# Patient Record
Sex: FEMALE | Race: ASIAN | NOT HISPANIC OR LATINO | Employment: UNEMPLOYED
[De-identification: names, ages, dates, MRNs, and addresses within clinical notes are randomized per-mention and may not be internally consistent; named-entity substitution may affect disease eponyms.]

---
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
## 2021-06-01 ENCOUNTER — RECORDS - HEALTHEAST (OUTPATIENT)
Dept: ADMINISTRATIVE | Facility: CLINIC | Age: 21
End: 2021-06-01

## 2021-06-01 VITALS — WEIGHT: 293 LBS | BODY MASS INDEX: 53.25 KG/M2

## 2021-06-01 VITALS — WEIGHT: 293 LBS | BODY MASS INDEX: 48.82 KG/M2 | HEIGHT: 65 IN

## 2021-06-16 PROBLEM — E66.01 MORBID OBESITY (H): Status: ACTIVE | Noted: 2018-08-15

## 2021-06-16 PROBLEM — E28.8 HYPERANDROGENISM: Status: ACTIVE | Noted: 2018-04-04

## 2021-06-17 NOTE — PROGRESS NOTES
Subjective:       History was provided by the sister.    Evonne Arteaga is a 17 y.o. female who is here for this well-child visit.    Immunization History   Administered Date(s) Administered     DTaP, historic 2000, 2000, 02/18/2001, 12/12/2001, 09/22/2005     HPV Quadrivalent 11/16/2011, 02/14/2012, 01/09/2013     Hep A, historic 07/08/2009     Hep B, historic 2000, 2000, 05/03/2001     Hepatitis A, Ped/Adol 2 Dose IM (18yr & under) 02/14/2012     HiB, historic,unspecified 2000, 2000, 02/18/2001, 08/24/2001     IPV 2000, 2000, 05/03/2001, 09/22/2005     Influenza, inj, historic,unspecified 10/18/2008     Influenza, seasonal,quad inj 6-35 mos 09/27/2010, 09/19/2011, 01/09/2013, 10/16/2013     Influenza,seasonal quad, PF, 36+MOS 12/23/2015     MMR 12/05/2001, 09/22/2005     Meningococcal MCV4P 11/16/2011, 09/02/2016     Tdap 11/16/2011     Varicella Zoster Immune Globulin 08/23/2013     Patient Active Problem List   Diagnosis     Obesity     Delayed Developmental Milestones     Urinary Incontinence     Microscopic Hematuria     Acanthosis Nigricans     Vitamin D deficiency     Pre-diabetes     Hyperlipidemia     Elevated alanine aminotransferase (ALT) level     Hyperandrogenism      The following portions of the patient's history were reviewed and updated as appropriate: allergies, current medications, past family history, past medical history, past social history, past surgical history and problem list.    Current Issues:  Need rx for undergarments due to incontinence.    Currently menstruating? no--reports she has never had a menstrual cycle.  Later relates that previously she had been given oral contraceptive pills.  She did have some vaginal bleeding then.    She did not continue to take those.    Further review of the record indicates normal pelvic ultrasound (structurally normal uterus and ovaries without evidence of excess follicles).    Laboratory evidence of  "hyperandrogenism.  Documentation of normal adrenal glands was incomplete from the renal ultrasound that I have access to.  I am not sure if this was completed.  Sexually active? no     Review of Nutrition:  Current diet: eats well  Balanced diet? unknown    Social Screening:   Family Unit: sister, brother, DAVE    Sister identifies herself as PCA for this patient.  Parental relations: ok    Secondhand smoke exposure? no    School: [employer] , Grade: 11th  School Concerns: Has IEP  Discipline concerns? no  Concerns regarding behavior with peers? no  School performance: doing well; no concerns    Sports/Exercise:  none  Social Activities(recreation, hobbies, TV): none  Peer Group (friends, dating, sexual activity) : family  Work/Future Plans: \"college\"    PHQ-9:  Little interest or pleasure in doing things: Not at all  Feeling down, depressed, or hopeless: Not at all  Trouble falling or staying asleep, or sleeping too much: Not at all  Feeling tired or having little energy: Not at all  Poor appetite or overeating: Not at all  Feeling bad about yourself - or that you are a failure or have let yourself or your family down: Not at all  Trouble concentrating on things, such as reading the newspaper or watching television: Not at all  Moving or speaking so slowly that other people could have noticed. Or the opposite - being so fidgety or restless that you have been moving around a lot more than usual: Not at all  Thoughts that you would be better off dead, or of hurting yourself in some way: Not at all  PHQ-9 Total Score: 0     Dyslipidemia Risk Screening  Have any of the child's parents or grandparents had a stroke or heart attack before age 55?: No  Any parents with high cholesterol or currently taking medications to treat?: No     Objective:   /76 (Patient Site: Right Arm, Patient Position: Sitting, Cuff Size: Adult Regular)  Pulse 80  Temp 99.7  F (37.6  C) (Oral)   Ht 5' 5\" (1.651 m)  Wt (!) 310 " "lb (140.6 kg)  Breastfeeding? No  BMI 51.59 kg/m2     Length: 5' 5\" (1.651 m) (62 %, Z= 0.31, Source: [facility] 2-20 Years)  Weight: 310 lb (140.6 kg) (>99 %, Z= 2.74, Source: [facility] 2-20 Years)  Blood Pressure: 118/76  BMI: Body mass index is 51.59 kg/(m^2).  BSA: Body surface area is 2.54 meters squared.    Growth parameters are noted and are appropriate for age.    Vitals:    04/04/18 1330   BP: 118/76   Patient Site: Right Arm   Patient Position: Sitting   Cuff Size: Adult Regular   Pulse: 80   Temp: 99.7  F (37.6  C)   TempSrc: Oral   Weight: (!) 310 lb (140.6 kg)   Height: 5' 5\" (1.651 m)     Gen:  Alert  Head:  normocephalic  EYES: PERRL/EOMI  ENT: Ears normal. TMs normal.  Normal oral pharynx.  Neck:  Normal, no masses  Resp:  Clear bilaterally  Cv:  Regular without murmur  Abd:  Morbidly obese, soft, no masses or organomegaly noted.  Musculoskeletal:  Normal muscle tone and bulk  Skin:  No rashes.  Warm and dry.  Neurologic:  Reflexes normal. Gross motor is normal.  Endocrine: deep voice, hirsuitism, acanthosis  Genitalia:  Normal female--small breasts, she declined genital and breast exams     Hearing Screening    Method: Audiometry    125Hz 250Hz 500Hz 1000Hz 2000Hz 3000Hz 4000Hz 6000Hz 8000Hz   Right ear:   Pass Pass Pass  Pass Pass    Left ear:   Pass Pass Pass  Pass Pass       Visual Acuity Screening    Right eye Left eye Both eyes   Without correction: 10/12.5 10/12.5    With correction:         Assessment:     Well adolescent          Obesity     Delayed Developmental Milestones     Urinary Incontinence     Microscopic Hematuria     Acanthosis Nigricans     Vitamin D deficiency     Pre-diabetes     Hyperlipidemia     Elevated alanine aminotransferase (ALT) level     Hyperandrogenism         Plan:     1. Anticipatory guidance discussed.  Gave handout on well-child issues at this age.    Social: plan for guardianship  Parenting: Homework  Nutrition: Dieting  Health: Dental Care  Safety: Seat Belts  Sexuality: " no plans, not sexually active    2.  Weight management:  The patient was counseled regarding nutrition and physical activity.    3. Development: appropriate for age    4. Annual dental check up is recommended    5. Immunizations today: None are due    6. Follow-up visit in 1 year for next well child visit, or sooner as needed.     7. Referrals: Needs follow up at Pediatric Endocrinology    1. Acanthosis Nigricans  2. Obesity  - Glycosylated Hemoglobin A1c  - Hepatic Profile  - not fasting so lipids deferred    3. Primary amenorrhea  Barring any change in labs, should start back on OCPs to decrease risk of endometrial hyperplasia.  - Basic Metabolic Panel  - Luteinizing Hormone (LH)  - Follicle Stimulating Hormone (FSH)  - Dehydroepiandrosterone Sulfate, Serum (DHEAS)  - HM2(CBC w/o Differential)  - Prolactin  - Ambulatory referral to Pediatric Endocrinology    4. Vitamin D deficiency  - Vitamin D, Total (25-Hydroxy)    5. Hyperandrogenism

## 2021-06-19 NOTE — PROGRESS NOTES
Subjective:  18 y.o. female with concerns of follow-up on a number of issues.  She has seen her endocrinologist in July.  She reports she is taking 3 medicines currently.  I am unsure what they are.  Most likely to include vitamin D, metformin, and an oral contraceptive pill.  There was a discussion of using 2 different preparations of vitamin D and having 7-10 days of Provera to induce a uterine bleed.  Patient does not report that that has occurred.  She has some irregular spotting at this time.  Unfortunately is difficult to say what medication she is taking at this time so is unknown if this is in relation or an absence to oral contraceptives.  She is here with her mother today.  They request a renewal for diapers.  Patient has turned 18.  She has 1 more year of high school to accomplish.  They needed forms to prove varus sella immunity which were provided for them today.    Patient states she is checking blood sugars occasionally.  The postprandial 1 hour glucoses are usually in the range of 100-130.  Fasting glucoses have been in the 80s.    Mother makes mention of some concern that vitamin D interact with brown rice.  Therefore she has not been offering brown rice but is rather been providing white kana rice.    Outpatient Medications Prior to Visit   Medication Sig Dispense Refill     ergocalciferol (VITAMIN D2) 50,000 unit capsule Take 1 capsule (50,000 Units total) by mouth once a week. 12 capsule 0     norethindrone-ethinyl estradiol (LOESTRIN 1/20, 21,) 1-20 mg-mcg per tablet Take 1 tablet by mouth daily. 84 tablet 3     No facility-administered medications prior to visit.       History   Smoking Status     Never Smoker   Smokeless Tobacco     Never Used      Objective:  /70 (Patient Site: Right Arm, Patient Position: Sitting, Cuff Size: Adult Regular) Comment (Patient Site): forearm  Pulse 80  Wt (!) 320 lb (145.2 kg)  LMP Comment: LMP middle June 2017-it was just spotting after she took the  birth control medication.  Breastfeeding? No  GENERAL: alert, not distressed  CHEST: clear, no rales, rhonchi, or wheezes  CARDIAC: regular without murmur  ABDOMEN: morbidly obese, soft, non tender, non distended, normal bowel sounds    Assessment and Plan:   1. Morbid obesity (H)  Discussed weight loss.  Discussed no known interaction between white rice, brown rice, or vitamin D.  Discussed brown rice recommended in light of insulin resistance.    2. Urinary incontinence, unspecified type  Order for urinary incontinence supplies to be sent to [facility].  - Incontinence supplies    3. Pre-diabetes  See diet counseling above regarding diet.  Daily exercise was recommended.  Recommended that they review home medications and return a call to us to inform us which medications are currently been taken.    We discussed that a follow-up with endocrinology in October has been recommended.    This visit was conducted with the aid of a professional .

## 2021-07-03 NOTE — ADDENDUM NOTE
Addendum Note by James Simental MD at 4/5/2018 10:48 AM     Author: James Simental MD Service: -- Author Type: Physician    Filed: 4/5/2018 10:48 AM Encounter Date: 4/4/2018 Status: Signed    : James Simental MD (Physician)    Addended by: JAMES SIMENTAL on: 4/5/2018 10:48 AM        Modules accepted: Orders

## 2021-07-09 ENCOUNTER — MEDICAL CORRESPONDENCE (OUTPATIENT)
Dept: HEALTH INFORMATION MANAGEMENT | Facility: CLINIC | Age: 21
End: 2021-07-09

## 2022-12-06 ENCOUNTER — OFFICE VISIT (OUTPATIENT)
Dept: FAMILY MEDICINE | Facility: CLINIC | Age: 22
End: 2022-12-06
Payer: COMMERCIAL

## 2022-12-06 VITALS
BODY MASS INDEX: 47.09 KG/M2 | HEART RATE: 111 BPM | WEIGHT: 293 LBS | OXYGEN SATURATION: 97 % | DIASTOLIC BLOOD PRESSURE: 85 MMHG | RESPIRATION RATE: 18 BRPM | HEIGHT: 66 IN | SYSTOLIC BLOOD PRESSURE: 134 MMHG

## 2022-12-06 DIAGNOSIS — L73.2 HIDRADENITIS SUPPURATIVA: Primary | ICD-10-CM

## 2022-12-06 DIAGNOSIS — E28.2 PCOS (POLYCYSTIC OVARIAN SYNDROME): ICD-10-CM

## 2022-12-06 PROCEDURE — 99204 OFFICE O/P NEW MOD 45 MIN: CPT | Mod: GC | Performed by: STUDENT IN AN ORGANIZED HEALTH CARE EDUCATION/TRAINING PROGRAM

## 2022-12-06 RX ORDER — DOXYCYCLINE 100 MG/1
100 CAPSULE ORAL 2 TIMES DAILY
Qty: 60 CAPSULE | Refills: 1 | Status: SHIPPED | OUTPATIENT
Start: 2022-12-06 | End: 2022-12-19

## 2022-12-06 RX ORDER — METFORMIN HCL 500 MG
TABLET, EXTENDED RELEASE 24 HR ORAL
Qty: 53 TABLET | Refills: 0 | Status: SHIPPED | OUTPATIENT
Start: 2022-12-06 | End: 2022-12-19

## 2022-12-06 NOTE — PROGRESS NOTES
"  CHIEF COMPLAINT                                                      Chief Complaint   Patient presents with     Cyst     Open Cyst on butt, muscle sore        ASSESSMENT/PLAN:     (L73.2) Hidradenitis suppurativa  Comment: New diagnosis for her. Has inflammatory nodules, scarring, one active abscess in the right axilla, and possible tracts in the groin. No systemic symptoms of infection, normal vitals. Given widespread nature, she would likely fail topical treatments and thus oral doxy is appropriate.   PCOS likely contributing (see below).  Plan:   -Start doxycycline hyclate (VIBRAMYCIN) 100 MG         Capsule  -Adult Dermatology Referral  -Follow up 3-4 weeks    (E28.2) PCOS (polycystic ovarian syndrome)  Comment: Hasn't had period since middle school, BMI is 56, and has virilism - meets criteria for PCOS diagnosis. Okay with starting metformin today.   Plan:  -Start metFORMIN (GLUCOPHAGE XR) 500 MG 24 hr tablet with taper instructions to increase to 500mg BID after one week  -F/u 3-4 weeks        Options for treatment and follow-up care were reviewed with the patient and/or guardian. Evonne Arteaga and/or guardian engaged in the decision making process and verbalized understanding of the options discussed and agreed with the final plan    Precepted with Dr. Rosenstein.    Petey Dudley MD - PGY3  [facility] Medicine Residency      SUBJECTIVE:                                                    Evonne Arteaga is a 22 year old year old female who presents to clinic today for the following health issues:    \"cyst on butt\"  Noticed it a week ago  Between anus and vagina  Bleeding when wiping  Not draining any pus  No fevers/chills/ns  Has had something like this before, has never been seen for it before  Thinks it's improving but doesn't know how    ----------------------------------------------------------------------------------------------------------------------  Patient Active Problem List   Diagnosis     " Delayed Developmental Milestones     Urinary incontinence     Microscopic Hematuria     Acanthosis Nigricans     Vitamin D deficiency     Pre-diabetes     Hyperlipidemia     Elevated alanine aminotransferase (ALT) level     Hyperandrogenism     Morbid obesity (H)     No past surgical history on file.    Social History     Tobacco Use     Smoking status: Never     Smokeless tobacco: Never   Substance Use Topics     Alcohol use: Not on file     No family history on file.      Problem list and past medical, surgical, social, and family histories reviewed & adjusted, as indicated.    Current Outpatient Medications   Medication Sig Dispense Refill     ACCU-CHEK FASTCLIX LANCET DRUM [ACCU-CHEK FASTCLIX LANCET DRUM] USE IN LANCING DEVICE TO CHECK BLOOD SUGAR 2 TIMES PER DAY.  3     ACCU-CHEK GUIDE strips [ACCU-CHEK GUIDE STRIPS] USE TO TEST BLOOD SUGAR 2 TIMES PER DAY.  3     ergocalciferol (VITAMIN D2) 50,000 unit capsule [ERGOCALCIFEROL (VITAMIN D2) 50,000 UNIT CAPSULE] Take 1 capsule (50,000 Units total) by mouth once a week. 12 capsule 0     metFORMIN (GLUCOPHAGE) 500 MG tablet [METFORMIN (GLUCOPHAGE) 500 MG TABLET] TAKE 1 TABLET DAILY FOR 1-2 WEEKS THEN 1 TAB TWICE DAILY X 1 WK, 1 + 2 TAB X 1WK, 2 TABS TWICE DAILY  3     norethindrone-ethinyl estradiol (LOESTRIN 1/20, 21,) 1-20 mg-mcg per tablet [NORETHINDRONE-ETHINYL ESTRADIOL (LOESTRIN 1/20, 21,) 1-20 MG-MCG PER TABLET] Take 1 tablet by mouth daily. 84 tablet 3     VITAMIN D3 2,000 unit capsule [VITAMIN D3 2,000 UNIT CAPSULE] Take 2,000 Units by mouth daily. Take 2,000 Units by mouth daily.  3     Medication list reviewed and updated as indicated.    No Known Allergies  Allergies reviewed and updated as indicated.  ----------------------------------------------------------------------------------------------------------------------  ROS:  Constitutional, HEENT, cardiovascular, pulmonary, GI, musculoskeletal, neuro, skin, and psych systems are negative, except as  "otherwise noted.    OBJECTIVE:     BP (!) 142/90   Pulse 111   Resp 18   Ht 1.676 m (5' 6\")   Wt (!) 159.2 kg (351 lb)   SpO2 97%   BMI 56.65 kg/m    Body mass index is 56.65 kg/m .  Exam:  Constitutional: healthy, alert and no distress  Head: Normocephalic. Atraumatic. Facial hair present on cheeks and upper lip, chin  Neck: Neck supple. FROM  Cardiovascular: Acyanotic  Respiratory: Normal chest rise. Non-labored breathing.  Musculoskeletal: extremities normal- no gross deformities noted, gait normal and normal muscle tone  Skin: Scattered discrete inflammatory nodules in b/l groin and axillae, with surrounding scarring and a couple tracts in groin. One nodule in R axilla draining pus. Minimal surrounding erythema, no warmth  Neurologic: Gait normal. CN II-XII grossly intact  Psychiatric: mentation appears normal and affect normal/bright      "

## 2022-12-09 NOTE — PROGRESS NOTES
Preceptor Attestation:   Patient seen, evaluated and discussed with the resident Dr. Dudley. I have verified the content of the note, which accurately reflects my assessment of the patient and the plan of care.    Supervising Physician:Benjamin Rosenstein, MD, MA  [facility]

## 2022-12-19 ENCOUNTER — OFFICE VISIT (OUTPATIENT)
Dept: FAMILY MEDICINE | Facility: CLINIC | Age: 22
End: 2022-12-19
Payer: COMMERCIAL

## 2022-12-19 VITALS
WEIGHT: 293 LBS | RESPIRATION RATE: 18 BRPM | DIASTOLIC BLOOD PRESSURE: 87 MMHG | BODY MASS INDEX: 47.09 KG/M2 | SYSTOLIC BLOOD PRESSURE: 134 MMHG | OXYGEN SATURATION: 96 % | HEIGHT: 66 IN | HEART RATE: 118 BPM

## 2022-12-19 DIAGNOSIS — E28.2 PCOS (POLYCYSTIC OVARIAN SYNDROME): ICD-10-CM

## 2022-12-19 DIAGNOSIS — L73.2 HIDRADENITIS SUPPURATIVA: ICD-10-CM

## 2022-12-19 PROCEDURE — 99213 OFFICE O/P EST LOW 20 MIN: CPT | Mod: GC | Performed by: STUDENT IN AN ORGANIZED HEALTH CARE EDUCATION/TRAINING PROGRAM

## 2022-12-19 RX ORDER — METFORMIN HCL 500 MG
TABLET, EXTENDED RELEASE 24 HR ORAL
Qty: 53 TABLET | Refills: 0 | Status: SHIPPED | OUTPATIENT
Start: 2022-12-19 | End: 2023-01-25

## 2022-12-19 RX ORDER — DOXYCYCLINE 100 MG/1
100 CAPSULE ORAL 2 TIMES DAILY
Qty: 60 CAPSULE | Refills: 1 | Status: SHIPPED | OUTPATIENT
Start: 2022-12-19

## 2022-12-19 NOTE — PATIENT INSTRUCTIONS
The Healthy [facility] Plate:    - Eat smaller portions- Caiv qhov ncauj los txo bhavik no kom tsawg*/txu bhavik no anastasiaam tsawg**.   - Eat only 1/4 plate of rice. Even better is no rice at all.  - If eating rice, make it brown rice and long-grain rice, not white rice/short-grain/sticky rice.   - Eat 1/2 plate of vegetables, more than rice, noodles, or meat.  - Eat 2 fruits a day.  - Avoid fatty meats and remove fat from meats before cooking.  - Drink water--not pop, fruit juice or alcohol.  - Use less salt (fish sauce, or soy sauce and pickled vegetables) and avoid mono-sodium glutamate (MSG)  - Boil, grill, or roast more than .  with small amount of vegetable oil. Stop using lard or pork fat.

## 2023-01-04 ENCOUNTER — OFFICE VISIT (OUTPATIENT)
Dept: FAMILY MEDICINE | Facility: CLINIC | Age: 23
End: 2023-01-04
Payer: COMMERCIAL

## 2023-01-04 VITALS
WEIGHT: 293 LBS | BODY MASS INDEX: 47.09 KG/M2 | DIASTOLIC BLOOD PRESSURE: 86 MMHG | SYSTOLIC BLOOD PRESSURE: 121 MMHG | HEART RATE: 119 BPM | HEIGHT: 66 IN | RESPIRATION RATE: 18 BRPM | OXYGEN SATURATION: 97 %

## 2023-01-04 DIAGNOSIS — Z02.89 ENCOUNTER FOR COMPLETION OF FORM WITH PATIENT: Primary | ICD-10-CM

## 2023-01-04 DIAGNOSIS — R62.0 DELAYED MILESTONES: ICD-10-CM

## 2023-01-04 DIAGNOSIS — L73.2 HIDRADENITIS SUPPURATIVA: ICD-10-CM

## 2023-01-04 DIAGNOSIS — F71 MODERATE INTELLECTUAL DISABILITIES: ICD-10-CM

## 2023-01-04 DIAGNOSIS — E28.2 PCOS (POLYCYSTIC OVARIAN SYNDROME): ICD-10-CM

## 2023-01-04 DIAGNOSIS — E66.01 MORBID OBESITY (H): ICD-10-CM

## 2023-01-04 PROCEDURE — 99214 OFFICE O/P EST MOD 30 MIN: CPT | Mod: GC | Performed by: STUDENT IN AN ORGANIZED HEALTH CARE EDUCATION/TRAINING PROGRAM

## 2023-01-04 RX ORDER — ESCITALOPRAM OXALATE 10 MG/1
TABLET ORAL
COMMUNITY
Start: 2023-01-03

## 2023-01-04 NOTE — PROGRESS NOTES
"  CHIEF COMPLAINT                                                      Chief Complaint   Patient presents with     Forms     Fill Medical Treatment form       ASSESSMENT/PLAN:     (Z02.89) Encounter for completion of form with patient  (primary encounter diagnosis)  Comment: Completed disability paperwork for the below disabilities that preclude her from working. Has PCA at home and  at [facility], see HPI for details.    (R62.0) Delayed Developmental Milestones    (F71) Moderate intellectual disability    (E66.01) Morbid obesity (H)  (L73.2) Hidradenitis suppurativa  (E28.2) PCOS (polycystic ovarian syndrome)  Comment: Seeing nutritionist 1/19 for deep dive on diet at home for elevated BMI contributing to PCOS. Hasn't gotten into derm yet, not sure the barrier. Suspect patient's family didn't know to call.  Plan:   -I called patient's PCA, Milagros, to help Evonne schedule with derm  -continue doxycycline in the meantime  -Sent derm referral again        Options for treatment and follow-up care were reviewed with the patient and/or guardian. Evonne Arteaga and/or guardian engaged in the decision making process and verbalized understanding of the options discussed and agreed with the final plan    Precepted with Alejandro Garcia MD.    Petey Dudley MD - PGY3  [facility] Medicine Residency      SUBJECTIVE:                                                    Evonne Arteaga is a 22 year old year old female who presents to clinic today for the following health issues:    Needs form filled out for disability  Mom says they don't know her disability  Has never had formal testing that mom knows of  Doesn't do well at school  Can't drive  Can't do chores around the house  Thus can't work at a job  They have a PCA named Milagros \"that comes to visit\" that I can call - [phone]  Past speech eval from 2015 - receptive and expressive speech dysfunction  [facility] (spoke to Jazmyn) has used \"morbid obesity, moderate " "intellectual disability, hyperandrogenism, vitamin D def, pre-diabetes, hyperlipidemia, elevated alanine aminotransferase, acanthosis nigricans, delayed developmental milestones, urinary incontinence\"  Current  at [facility] is Laura Jin - phone number [phone].    ----------------------------------------------------------------------------------------------------------------------  Patient Active Problem List   Diagnosis     Delayed Developmental Milestones     Urinary incontinence     Microscopic Hematuria     Acanthosis Nigricans     Vitamin D deficiency     Pre-diabetes     Hyperlipidemia     Elevated alanine aminotransferase (ALT) level     Hyperandrogenism     Morbid obesity (H)     No past surgical history on file.    Social History     Tobacco Use     Smoking status: Never     Smokeless tobacco: Never   Substance Use Topics     Alcohol use: Not on file     No family history on file.      Problem list and past medical, surgical, social, and family histories reviewed & adjusted, as indicated.    Current Outpatient Medications   Medication Sig Dispense Refill     ACCU-CHEK FASTCLIX LANCET DRUM [ACCU-CHEK FASTCLIX LANCET DRUM] USE IN LANCING DEVICE TO CHECK BLOOD SUGAR 2 TIMES PER DAY.  3     ACCU-CHEK GUIDE strips [ACCU-CHEK GUIDE STRIPS] USE TO TEST BLOOD SUGAR 2 TIMES PER DAY.  3     doxycycline hyclate (VIBRAMYCIN) 100 MG capsule Take 1 capsule (100 mg) by mouth 2 times daily 60 capsule 1     metFORMIN (GLUCOPHAGE XR) 500 MG 24 hr tablet Take 1 tablet (500 mg) by mouth daily (with dinner) for 7 days, THEN 1 tablet (500 mg) 2 times daily (with meals) for 23 days. 53 tablet 0     metFORMIN (GLUCOPHAGE) 500 MG tablet [METFORMIN (GLUCOPHAGE) 500 MG TABLET] TAKE 1 TABLET DAILY FOR 1-2 WEEKS THEN 1 TAB TWICE DAILY X 1 WK, 1 + 2 TAB X 1WK, 2 TABS TWICE DAILY  3     norethindrone-ethinyl estradiol (LOESTRIN 1/20, 21,) 1-20 mg-mcg per tablet [NORETHINDRONE-ETHINYL ESTRADIOL (LOESTRIN 1/20, 21,) " "1-20 MG-MCG PER TABLET] Take 1 tablet by mouth daily. 84 tablet 3     Medication list reviewed and updated as indicated.    No Known Allergies  Allergies reviewed and updated as indicated.  ----------------------------------------------------------------------------------------------------------------------  ROS:  Constitutional, HEENT, cardiovascular, pulmonary, GI, musculoskeletal, neuro, skin, and psych systems are negative, except as otherwise noted.    OBJECTIVE:     /86   Pulse 119   Resp 18   Ht 1.676 m (5' 6\")   Wt (!) 156 kg (344 lb)   SpO2 97%   BMI 55.52 kg/m    Body mass index is 55.52 kg/m .  Exam:  Constitutional: healthy, alert and no distress  Head: Normocephalic. Atraumatic  Neck: Neck supple. FROM  Cardiovascular: Acyanotic  Respiratory: Normal chest rise. Non-labored breathing.  Musculoskeletal: extremities normal- no gross deformities noted, gait normal and normal muscle tone  Skin: no suspicious lesions or rashes  Neurologic: Gait normal. CN II-XII grossly intact  Psychiatric: mentation appears normal and affect normal/bright      "

## 2023-01-04 NOTE — PROGRESS NOTES
Preceptor Attestation:   Patient seen, evaluated and discussed with the resident. I have verified the content of the note, which accurately reflects my assessment of the patient and the plan of care.  Supervising Physician:Alejandro Garcia MD  [facility]

## 2023-01-06 ENCOUNTER — TELEPHONE (OUTPATIENT)
Dept: DERMATOLOGY | Facility: CLINIC | Age: 23
End: 2023-01-06

## 2023-01-06 NOTE — TELEPHONE ENCOUNTER
This encounter is being sent to inform the clinic that this patient has a referral from Petey Dudley MD in [facility] for the diagnoses of New Hidradenitis suppurativa and has requested that this patient be seen within 1 and/or with 2 weeks.  Based on the availability of our provider(s), we are unable to accommodate this request.      Were all sites offered this patient?  Yes      Does scheduling algorithm request to schedule next available?  Patient has been scheduled for the first available opening with Dr. Barone on 06/22/23.  We have informed the patient that the clinic will review their referral and reach out if a sooner appointment is medically necessary.

## 2023-01-18 ENCOUNTER — OFFICE VISIT (OUTPATIENT)
Dept: FAMILY MEDICINE | Facility: CLINIC | Age: 23
End: 2023-01-18
Payer: COMMERCIAL

## 2023-01-18 VITALS
RESPIRATION RATE: 20 BRPM | OXYGEN SATURATION: 96 % | SYSTOLIC BLOOD PRESSURE: 121 MMHG | HEART RATE: 116 BPM | BODY MASS INDEX: 47.09 KG/M2 | DIASTOLIC BLOOD PRESSURE: 87 MMHG | WEIGHT: 293 LBS | HEIGHT: 66 IN

## 2023-01-18 DIAGNOSIS — Z02.89 ENCOUNTER FOR COMPLETION OF FORM WITH PATIENT: Primary | ICD-10-CM

## 2023-01-18 DIAGNOSIS — N91.2 AMENORRHEA: ICD-10-CM

## 2023-01-18 PROCEDURE — 99213 OFFICE O/P EST LOW 20 MIN: CPT | Mod: GC | Performed by: STUDENT IN AN ORGANIZED HEALTH CARE EDUCATION/TRAINING PROGRAM

## 2023-01-18 RX ORDER — NORETHINDRONE ACETATE AND ETHINYL ESTRADIOL .02; 1 MG/1; MG/1
1 TABLET ORAL DAILY
Qty: 90 TABLET | Refills: 1 | Status: SHIPPED | OUTPATIENT
Start: 2023-01-18

## 2023-01-18 NOTE — PROGRESS NOTES
"OUTPATIENT CLINICAL NUTRITION SERVICES ASSESSMENT    Video-Visit Details    Type of service: Video Visit    Video Start Time (time video started): 10:59 am    Video End Time (time video stopped): 11:22 am    Originating Location (pt. Location): Home    Distant Location (provider location): On-site    Mode of Communication: Video Conference via Marian Busch RD    REASON FOR ASSESSMENT  Evonne Arteaga referred by aFm Norris MD for MNT related to  E28.2 (ICD-10-CM) - PCOS (polycystic ovarian syndrome)    Weight Loss - Overweight/Obesity, PCOS    Per referral encounter note:  \"The Healthy Hmong Plate:     - Eat smaller portions- Caiv qhov ncauj los txo bhavik noj kom tsawg*/txu bhavik noj kuam tsawg**.   - Eat only 1/4 plate of rice. Even better is no rice at all.  - If eating rice, make it brown rice and long-grain rice, not white rice/short-grain/sticky rice.   - Eat 1/2 plate of vegetables, more than rice, noodles, or meat.  - Eat 2 fruits a day.  - Avoid fatty meats and remove fat from meats before cooking.  - Drink water--not pop, fruit juice or alcohol.  - Use less salt (fish sauce, or soy sauce and pickled vegetables) and avoid mono-sodium glutamate (MSG)  - Boil, grill, or roast more than .  with small amount of vegetable oil. Stop using lard or pork fat.\"    Patient accompanied by: N/A    ASSESSMENT   -PMH: acanthosis nigricans, vitamin D def, hyperlipidemia, hyperandrogenism, morbid obesity, moderate intellectual disabilities, urinary incontinence    Would like help losing weight, denies any other needs or concerns. Does report that she is pretty sure she has seen a RD before but is not sure, did not specify what for.     Nutritional Details:   -Food allergies: none  -Food sensitivities: none  -GI concerns: none  -Appetite: good  -Pace of eating: in the middle   -Role of cooking: help with that  -Role of food shopping: help with that      Fruits and vegetables: doesn't recall any fruits " "or vegetables that she likes, eats them throughout the day  Does eat white rice, has tried brown rice but is not sure if she really likes it because it tastes \"different\" than what she is used to    Proteins: depends on what her mom is cooking, she does like chicken but she does not really prefer any other protein foods    Fat: unsure if her mom adds butter or oil to food, believe pt denied but difficult to discern    Diet Recall:  Does not have morning meal because she usually wakes up around 11 or 12 am  Lunch: depends 11 am or 12 pm, is not sure  Dinner: depends 4 pm or 5 pm, is not sure  Snack: throughout the day, chips - grazing   Beverages: water, does not drink any other beverages per report  Right before bed: eating snacks   Dining out: once in a while, depends on what she's feeling     Physical Activity: none, daily living   Days per week: N/A  Duration: N/A  Activity type: N/A  Limitations: none    NUTRITION FOCUSED PHYSICAL ASSESSMENT (NFPA) FOR DIAGNOSING MALNUTRITION  No: unable to adequately assess due to video visit          Observed: unable to adequately assess due to video visit     Obtained from Chart/Interdisciplinary Team: none noted    LABS  Labs reviewed   Latest Reference Range & Units Most Recent   Vitamin D, Total (25-Hydroxy) 30.0 - 80.0 ng/mL 7.5 (L)  4/4/18 14:12   (L): Data is abnormally low     Latest Reference Range & Units Most Recent   LDL Cholesterol Direct <130 mg/dL 204 (H)  8/23/13 11:31   (H): Data is abnormally high    MEDICATIONS  Medications reviewed - metformin, doxycycline hyclate, loestrin for amenorrhea    ANTHROPOMETRICS   Height: 1.676 m (5' 6\")  Weight: 156 kg (344 lbs)  BMI (kg/m2): 56.17  Weight Status: Obesity Grade III BMI >40  IBW: 130 lbs  ADJ BW: 184 lbs  %IBW: 265  Weight History:   Wt Readings from Last 15 Encounters:   01/04/23 (!) 156 kg (344 lb)   12/19/22 (!) 157.9 kg (348 lb)   12/06/22 (!) 159.2 kg (351 lb)   08/15/18 145.2 kg (320 lb) (>99 %, Z= 2.79)* "   04/04/18 140.6 kg (310 lb) (>99 %, Z= 2.74)*   02/11/16 135.2 kg (298 lb) (>99 %, Z= 2.89)*   08/18/14 121.1 kg (267 lb) (>99 %, Z= 3.01)*   10/30/13 (!) 115.2 kg (254 lb) (>99 %, Z= 3.09)*   10/16/13 (!) 112.9 kg (249 lb) (>99 %, Z= 3.06)*   08/23/13 (!) 112.5 kg (248 lb) (>99 %, Z= 3.09)*   01/09/13 (!) 103 kg (227 lb) (>99 %, Z= 3.05)*   02/14/12 92.1 kg (203 lb) (>99 %, Z= 3.04)*   11/16/11 92.5 kg (204 lb) (>99 %, Z= 3.13)*   01/26/11 79.8 kg (176 lb) (>99 %, Z= 3.02)*   02/01/10 70.3 kg (155 lb) (>99 %, Z= 3.04)*     * Growth percentiles are based on CDC (Girls, 2-20 Years) data.   Steady weight gain overall    What is your UBW? Any changes to your weight recently?  343 lbs currently from the last weight she remembers, slowly going down but doesn't remember the highest weight it was. Does not weigh herself at home, gets weighed at the Dr.'s office. No particular goal offered by pt, would like general weight loss per overall discussion.    Dosing weight: 83.6 kg using ADJ BW    ASSESSED NUTRITION NEEDS  Estimated Energy Needs: 1,672-2,090 kcals/day (20-25 Kcal/Kg)  Justification: (obese)  Estimated Protein Needs: 67-84 grams protein/day (0.8-1 g pro/Kg)  Justification: (preservation of lean body mass)  Estimated Fluid Needs: 2,508 mL/day (30 mL/kg)    ASSESSED MALNUTRITION STATUS  % Weight Loss: Weight loss does not meet criteria for malnutrition - presumed intentional if present due to desire for weight management   % Intake: Decreased intake does not meet criteria for malnutrition - presumed intentional if present due to desire for weight management   Subcutaneous Fat Loss: Unable to adequately assess  Loss of Muscle Mass: Unable to adequately assess  Fluid Retention: None noted    Malnutrition Diagnosis: Unable to determine due to lack of adequate NFPE related to video visit     DIAGNOSIS   Nutrition Diagnosis: Excessive energy intake related to frequent consumption of high-calorie, high-fat foods as  "evidenced by BMI of 56.17 and dietary recall of snacking    INTERVENTIONS   Nutrition Prescription: general, healthy nutrition recommendations following a whole foods approach, focusing on MyPlate method for guidance. Ensuring intake of plenty of fruits and vegetables, of a variety of colors and types to promote antioxidant, vitamin/mineral intake. Focusing on lean proteins and avoiding foods that are high in saturated, trans fat. Implementing plenty of plant-based proteins such as nuts, seeds, legumes, and beans. Importance of WGs to promote bowel regularity via insoluble fiber intake. Soluble fiber regularly to help lower cholesterol levels. Focusing on reducing overall refined sugar, high calorie foods. Mindfulness with eating to determine when eating, why, how much.    IMPLEMENTATION   Assessed learning needs and learning preference: N/A  Teaching Method(s) used: Booklet / Handout  Explanation    Nutrition Education (Content):              a)  Discussed: went over pt's current intake, foods that she usually consumes, MyPlate method and explained the importance of satisfaction, fullness with meals, listening to hunger/fullness cues, becoming aware of where you are on the scale when eating, turning snacks into a 1 mini meal (since pt does not eat breakfast) to prevent grazing and \"overconsumption,\" limiting CHO to 1/4 of the plate, focusing on increasing fruits and vegetables, foods rich in fiber to reduce overall calories and increase fullness. Difference between protein, fat, and CHO. Etc.               b)  Provided the following handouts: Eat Right with MyPlate, General, Healthful Nutrition Therapy (2022), Tips for Adding Protein (2018), Healthy Snack List, Hunger-Fullness Scale, Heart-Healthy Fiber Tips (2017)  *pt requesting handouts be texted to her mobile phone number. If unable to do this, RD will contact pt via phone call (pt does not have a MyChart) to see if pt is okay with alternative method of sending " handouts ex. email, home address, etc.    Nutrition Education (Application):              a)  Discussed current eating plans and/or recommended alternative food choices              b)  Patient verbalizes understanding of diet by creating goals that reflect diet recommendations and offering no additional questions or concerns at the end of the meeting.    Anticipate fair compliance   Stage of Change: contemplation  Additional: pt does have a desire for weight loss but provided very little details regarding usual intake - RD provided general recommendations, specific to her as much as able. Is not quite in preparation stage as is not wanting and/or not sure of making a goal, possible area to work on with the information provided.     GOALS  N/A     FOLLOW UP/MONITORING   Progress towards goals will be monitored and evaluated per protocol and Practice Guidelines    Would like to wait and see how it goes before scheduling a Follow-up appointment     Time Spent with Patient  Approx. 23 minutes    Rita Busch RD, LD  Clinical Dietitian  Office: [phone]  Weekend pager: [phone]

## 2023-01-18 NOTE — PROGRESS NOTES
CHIEF COMPLAINT                                                      Chief Complaint   Patient presents with     Forms     DISABILITY FORM       ASSESSMENT/PLAN:     (Z02.89) Encounter for completion of form with patient  (primary encounter diagnosis)  Comment: Medical opinion form filled out for sliding scale insulin with patient today; qualifying diagnoses of moderate intellectual disability, delayed developmental milestones, and morbid obesity.    (N91.2) Amenorrhea  Comment: taking metformin 500mg BID for PCOS. Wants to get back on her birth control as well. Declines a dose increase on metformin at this time.  Plan:   -Restart norethindrone-ethinyl estradiol (MICROGESTIN         1/20) 1-20 MG-MCG tablet        Options for treatment and follow-up care were reviewed with the patient and/or guardian. Evonne Arteaga and/or guardian engaged in the decision making process and verbalized understanding of the options discussed and agreed with the final plan    Precepted with Dr. Rosenstein.    Petey Dudley MD - PGY3  [facility] [place] [facility]      SUBJECTIVE:                                                    Evonne Arteaga is a 22 year old year old female who presents to clinic today for the following health issues:    Disability form  Mental/developmental disability keeping her from working  Applying for sliding scale insulin    HS  Seeing derm next week  Wants to stick with 500mg BID metformin dose  Wants to go back on birth control pills  Not sexually active    ----------------------------------------------------------------------------------------------------------------------  Patient Active Problem List   Diagnosis     Delayed Developmental Milestones     Urinary incontinence     Acanthosis Nigricans     Vitamin D deficiency     Hyperlipidemia     Hyperandrogenism     Morbid obesity (H)     Moderate intellectual disabilities     No past surgical history on file.    Social History     Tobacco Use      "Smoking status: Never     Smokeless tobacco: Never   Substance Use Topics     Alcohol use: Not on file     No family history on file.      Problem list and past medical, surgical, social, and family histories reviewed & adjusted, as indicated.    Current Outpatient Medications   Medication Sig Dispense Refill     ACCU-CHEK FASTCLIX LANCET DRUM [ACCU-CHEK FASTCLIX LANCET DRUM] USE IN LANCING DEVICE TO CHECK BLOOD SUGAR 2 TIMES PER DAY.  3     ACCU-CHEK GUIDE strips [ACCU-CHEK GUIDE STRIPS] USE TO TEST BLOOD SUGAR 2 TIMES PER DAY.  3     doxycycline hyclate (VIBRAMYCIN) 100 MG capsule Take 1 capsule (100 mg) by mouth 2 times daily 60 capsule 1     escitalopram (LEXAPRO) 10 MG tablet        metFORMIN (GLUCOPHAGE XR) 500 MG 24 hr tablet Take 1 tablet (500 mg) by mouth daily (with dinner) for 7 days, THEN 1 tablet (500 mg) 2 times daily (with meals) for 23 days. 53 tablet 0     metFORMIN (GLUCOPHAGE) 500 MG tablet [METFORMIN (GLUCOPHAGE) 500 MG TABLET] TAKE 1 TABLET DAILY FOR 1-2 WEEKS THEN 1 TAB TWICE DAILY X 1 WK, 1 + 2 TAB X 1WK, 2 TABS TWICE DAILY  3     norethindrone-ethinyl estradiol (LOESTRIN 1/20, 21,) 1-20 mg-mcg per tablet [NORETHINDRONE-ETHINYL ESTRADIOL (LOESTRIN 1/20, 21,) 1-20 MG-MCG PER TABLET] Take 1 tablet by mouth daily. 84 tablet 3     Medication list reviewed and updated as indicated.    No Known Allergies  Allergies reviewed and updated as indicated.  ----------------------------------------------------------------------------------------------------------------------  ROS:  Constitutional, HEENT, cardiovascular, pulmonary, GI, musculoskeletal, neuro, skin, and psych systems are negative, except as otherwise noted.    OBJECTIVE:     /87   Pulse 116   Resp 20   Ht 1.676 m (5' 6\")   Wt (!) 155.1 kg (342 lb)   SpO2 96%   BMI 55.20 kg/m    Body mass index is 55.2 kg/m .  Exam:  Constitutional: healthy, alert and no distress  Head: Normocephalic. Atraumatic  Neck: Neck supple. " FROM  Cardiovascular: Acyanotic  Respiratory: Normal chest rise. Non-labored breathing.  Musculoskeletal: extremities normal- no gross deformities noted, gait normal and normal muscle tone  Skin: no suspicious lesions or rashes  Neurologic: Gait normal. CN II-XII grossly intact  Psychiatric: mentation appears normal and affect normal/bright

## 2023-01-19 ENCOUNTER — HOSPITAL ENCOUNTER (OUTPATIENT)
Dept: NUTRITION | Facility: CLINIC | Age: 23
Discharge: HOME | End: 2023-01-19
Admitting: FAMILY MEDICINE
Payer: COMMERCIAL

## 2023-01-19 DIAGNOSIS — E28.2 PCOS (POLYCYSTIC OVARIAN SYNDROME): ICD-10-CM

## 2023-01-19 PROCEDURE — 97802 MEDICAL NUTRITION INDIV IN: CPT | Mod: GT,95

## 2023-01-25 DIAGNOSIS — E28.2 PCOS (POLYCYSTIC OVARIAN SYNDROME): ICD-10-CM

## 2023-01-25 RX ORDER — METFORMIN HCL 500 MG
500 TABLET, EXTENDED RELEASE 24 HR ORAL 2 TIMES DAILY WITH MEALS
Qty: 180 TABLET | Refills: 1 | Status: SHIPPED | OUTPATIENT
Start: 2023-01-25 | End: 2023-07-24

## 2023-01-26 ENCOUNTER — OFFICE VISIT (OUTPATIENT)
Dept: DERMATOLOGY | Facility: CLINIC | Age: 23
End: 2023-01-26
Payer: COMMERCIAL

## 2023-01-26 DIAGNOSIS — L73.2 HIDRADENITIS SUPPURATIVA: Primary | ICD-10-CM

## 2023-01-26 PROCEDURE — 99243 OFF/OP CNSLTJ NEW/EST LOW 30: CPT | Performed by: DERMATOLOGY

## 2023-01-26 RX ORDER — CLINDAMYCIN AND BENZOYL PEROXIDE 10; 50 MG/G; MG/G
GEL TOPICAL 2 TIMES DAILY
Qty: 50 G | Refills: 6 | Status: SHIPPED | OUTPATIENT
Start: 2023-01-26

## 2023-01-26 RX ORDER — DOXYCYCLINE 100 MG/1
100 CAPSULE ORAL 2 TIMES DAILY
Qty: 60 CAPSULE | Refills: 6 | Status: SHIPPED | OUTPATIENT
Start: 2023-01-26

## 2023-01-26 ASSESSMENT — PAIN SCALES - GENERAL: PAINLEVEL: NO PAIN (0)

## 2023-01-26 NOTE — PROGRESS NOTES
Evonne Arteaga , a 22 year old year old female patient, I was asked to see by Dr. Dudley for Hidradenitis.  Patient states this has been present for years.  Patient reports the following symptoms:  Pimples noah xilla and groin.   .  Patient reports the following previous treatments doxy.  Patient reports the following modifying factors none.  Associated symptoms: none.  Patient has no other skin complaints today.  Remainder of the HPI, Meds, PMH, Allergies, FH, and SH was reviewed in chart.      Past Medical History:   Diagnosis Date     Elevated alanine aminotransferase (ALT) level 4/26/2016     Microscopic hematuria     Created by [facility] Annotation: Nov 16 2011  5:17PM - Freddie, Timmy: normal bilateral  renal U/S in 2/2011.        No past surgical history on file.     No family history on file.    Social History     Socioeconomic History     Marital status: Single     Spouse name: Not on file     Number of children: Not on file     Years of education: Not on file     Highest education level: Not on file   Occupational History     Not on file   Tobacco Use     Smoking status: Never     Smokeless tobacco: Never   Substance and Sexual Activity     Alcohol use: Not on file     Drug use: Not on file     Sexual activity: Not on file   Other Topics Concern     Not on file   Social History Narrative     Not on file     Social Determinants of Health     Financial Resource Strain: Not on file   Food Insecurity: Not on file   Transportation Needs: Not on file   Physical Activity: Not on file   Stress: Not on file   Social Connections: Not on file   Intimate Partner Violence: Not on file   Housing Stability: Not on file       Outpatient Encounter Medications as of 1/26/2023   Medication Sig Dispense Refill     ACCU-CHEK FASTCLIX LANCET DRUM [ACCU-CHEK FASTCLIX LANCET DRUM] USE IN LANCING DEVICE TO CHECK BLOOD SUGAR 2 TIMES PER DAY.  3     ACCU-CHEK GUIDE strips [ACCU-CHEK GUIDE STRIPS] USE TO TEST BLOOD SUGAR 2 TIMES  PER DAY.  3     doxycycline hyclate (VIBRAMYCIN) 100 MG capsule Take 1 capsule (100 mg) by mouth 2 times daily 60 capsule 1     escitalopram (LEXAPRO) 10 MG tablet        metFORMIN (GLUCOPHAGE XR) 500 MG 24 hr tablet Take 1 tablet (500 mg) by mouth 2 times daily (with meals) for 180 days 180 tablet 1     metFORMIN (GLUCOPHAGE) 500 MG tablet [METFORMIN (GLUCOPHAGE) 500 MG TABLET] TAKE 1 TABLET DAILY FOR 1-2 WEEKS THEN 1 TAB TWICE DAILY X 1 WK, 1 + 2 TAB X 1WK, 2 TABS TWICE DAILY  3     norethindrone-ethinyl estradiol (MICROGESTIN 1/20) 1-20 MG-MCG tablet Take 1 tablet by mouth daily 90 tablet 1     No facility-administered encounter medications on file as of 1/26/2023.             Review Of Systems  Skin: As above  Eyes: negative  Ears/Nose/Throat: negative  Respiratory: No shortness of breath, dyspnea on exertion, cough, or hemoptysis  Cardiovascular: negative  Gastrointestinal: negative  Genitourinary: negative  Musculoskeletal: negative  Neurologic: negative  Psychiatric: negative  Hematologic/Lymphatic/Immunologic: negative  Endocrine: negative      O:   NAD, WDWN, Alert & Oriented, Mood & Affect wnl, Vitals stable   Here today with mom    General appearance natty ii   Vitals stable   Alert, oriented and in no acute distress   Inflammatory nodules in groin and axilla      Eyes: Conjunctivae/lids:Normal     ENT: Lips, buccal mucosa, tongue: normal    MSK:Normal    Cardiovascular: peripheral edema none    Pulm: Breathing Normal    Neuro/Psych: Orientation:Normal; Mood/Affect:Normal      A/P:  1. HS  Pathophysiology discussed with pateint   Doxy, hibiclens and bpo as well as humira discussed with patient   She does not want injection today   Weight loss discussed with patient   She does not smoke    Doxy twice daily  hibiclens daily do not put on face  benzaclin twice daily  Return to clinic 3 months  It was a pleasure speaking to Evonne Arteaga today.  Previous clinic  notes and pertinent laboratory tests were reviewed  prior to Evonne Arteaga's visit.

## 2023-01-26 NOTE — LETTER
1/26/2023         RE: Evonne Arteaga  [address]  [place] [address]        Dear Colleague,    Thank you for referring your patient, Evonne Arteaga, to the [facility]. Please see a copy of my visit note below.    Evonne Arteaga , a 22 year old year old female patient, I was asked to see by Dr. Dudley for Hidradenitis.  Patient states this has been present for years.  Patient reports the following symptoms:  Pimples noah xilla and groin.   .  Patient reports the following previous treatments doxy.  Patient reports the following modifying factors none.  Associated symptoms: none.  Patient has no other skin complaints today.  Remainder of the HPI, Meds, PMH, Allergies, FH, and SH was reviewed in chart.      Past Medical History:   Diagnosis Date     Elevated alanine aminotransferase (ALT) level 4/26/2016     Microscopic hematuria     Created by [facility] Annotation: Nov 16 2011  5:17PM - Freddie Timmy: normal bilateral  renal U/S in 2/2011.        No past surgical history on file.     No family history on file.    Social History     Socioeconomic History     Marital status: Single     Spouse name: Not on file     Number of children: Not on file     Years of education: Not on file     Highest education level: Not on file   Occupational History     Not on file   Tobacco Use     Smoking status: Never     Smokeless tobacco: Never   Substance and Sexual Activity     Alcohol use: Not on file     Drug use: Not on file     Sexual activity: Not on file   Other Topics Concern     Not on file   Social History Narrative     Not on file     Social Determinants of Health     Financial Resource Strain: Not on file   Food Insecurity: Not on file   Transportation Needs: Not on file   Physical Activity: Not on file   Stress: Not on file   Social Connections: Not on file   Intimate Partner Violence: Not on file   Housing Stability: Not on file       Outpatient Encounter Medications as of 1/26/2023    Medication Sig Dispense Refill     ACCU-CHEK FASTCLIX LANCET DRUM [ACCU-CHEK FASTCLIX LANCET DRUM] USE IN LANCING DEVICE TO CHECK BLOOD SUGAR 2 TIMES PER DAY.  3     ACCU-CHEK GUIDE strips [ACCU-CHEK GUIDE STRIPS] USE TO TEST BLOOD SUGAR 2 TIMES PER DAY.  3     doxycycline hyclate (VIBRAMYCIN) 100 MG capsule Take 1 capsule (100 mg) by mouth 2 times daily 60 capsule 1     escitalopram (LEXAPRO) 10 MG tablet        metFORMIN (GLUCOPHAGE XR) 500 MG 24 hr tablet Take 1 tablet (500 mg) by mouth 2 times daily (with meals) for 180 days 180 tablet 1     metFORMIN (GLUCOPHAGE) 500 MG tablet [METFORMIN (GLUCOPHAGE) 500 MG TABLET] TAKE 1 TABLET DAILY FOR 1-2 WEEKS THEN 1 TAB TWICE DAILY X 1 WK, 1 + 2 TAB X 1WK, 2 TABS TWICE DAILY  3     norethindrone-ethinyl estradiol (MICROGESTIN 1/20) 1-20 MG-MCG tablet Take 1 tablet by mouth daily 90 tablet 1     No facility-administered encounter medications on file as of 1/26/2023.             Review Of Systems  Skin: As above  Eyes: negative  Ears/Nose/Throat: negative  Respiratory: No shortness of breath, dyspnea on exertion, cough, or hemoptysis  Cardiovascular: negative  Gastrointestinal: negative  Genitourinary: negative  Musculoskeletal: negative  Neurologic: negative  Psychiatric: negative  Hematologic/Lymphatic/Immunologic: negative  Endocrine: negative      O:   NAD, WDWN, Alert & Oriented, Mood & Affect wnl, Vitals stable   Here today with mom    General appearance natty ii   Vitals stable   Alert, oriented and in no acute distress   Inflammatory nodules in groin and axilla      Eyes: Conjunctivae/lids:Normal     ENT: Lips, buccal mucosa, tongue: normal    MSK:Normal    Cardiovascular: peripheral edema none    Pulm: Breathing Normal    Neuro/Psych: Orientation:Normal; Mood/Affect:Normal      A/P:  1. HS  Pathophysiology discussed with pateint   Doxy, hibiclens and bpo as well as humira discussed with patient   She does not want injection today   Weight loss discussed with  patient   She does not smoke    Doxy twice daily  hibiclens daily do not put on face  benzaclin twice daily  Return to clinic 3 months  It was a pleasure speaking to Evonne Arteaga today.  Previous clinic  notes and pertinent laboratory tests were reviewed prior to Evonne Arteaga's visit.        Again, thank you for allowing me to participate in the care of your patient.        Sincerely,        Ankur Barone MD

## 2023-02-01 ENCOUNTER — OFFICE VISIT (OUTPATIENT)
Dept: FAMILY MEDICINE | Facility: CLINIC | Age: 23
End: 2023-02-01
Payer: COMMERCIAL

## 2023-02-01 VITALS
RESPIRATION RATE: 20 BRPM | HEART RATE: 95 BPM | DIASTOLIC BLOOD PRESSURE: 89 MMHG | OXYGEN SATURATION: 97 % | SYSTOLIC BLOOD PRESSURE: 135 MMHG

## 2023-02-01 DIAGNOSIS — L73.2 HIDRADENITIS SUPPURATIVA: Primary | ICD-10-CM

## 2023-02-01 DIAGNOSIS — E28.2 PCOS (POLYCYSTIC OVARIAN SYNDROME): ICD-10-CM

## 2023-02-01 PROCEDURE — 99214 OFFICE O/P EST MOD 30 MIN: CPT | Mod: GC | Performed by: STUDENT IN AN ORGANIZED HEALTH CARE EDUCATION/TRAINING PROGRAM

## 2023-02-01 NOTE — PROGRESS NOTES
Preceptor Attestation:  Patient's case reviewed and discussed with Petey Dudley MD resident and I evaluated the patient. I agree with written assessment and plan of care.  Supervising Physician:  Ankur Cabrera MD, MD [facility]

## 2023-02-01 NOTE — PROGRESS NOTES
CHIEF COMPLAINT                                                    F/u weight, skin    ASSESSMENT/PLAN:     (L73.2) Hidradenitis suppurativa  (primary encounter diagnosis)  Comment: Now following with derm. Added hibiclens and benzacline, continue doxy. Reportedly well controlled today.  Plan:   -F/iu with derm in 3 months    (E28.2) PCOS (polycystic ovarian syndrome)  Comment: Now down 9 pounds in the last 2 months! On metformin 1g, wants to stick there for now. Saw RD last week. Not yet working on exercise.  Plan:   -discussed introducing an exercise program - walking, youtube videos, yoga, etc  -continue metformin 1g for now  -f/u 3 months - potentially increase metformin at that time        Options for treatment and follow-up care were reviewed with the patient and/or guardian. Evonne Arteaga and/or guardian engaged in the decision making process and verbalized understanding of the options discussed and agreed with the final plan    Precepted with Ankur Cabrera MD.    Petey Dudley MD - PGY3  [facility]      SUBJECTIVE:                                                    Evonne Arteaga is a 22 year old year old female who presents to clinic today for the following health issues:    F/u skin  Things are going well   Saw derm last week - added hibiclens and benzaclin. F/u 3 months  Saw nutrition as well, f/u prn  Down 9 pounds in last 2 months  Hasn't noticed any difference in energy levels, motivation  Wants to wait on metformin dose increase    ----------------------------------------------------------------------------------------------------------------------  Patient Active Problem List   Diagnosis     Delayed Developmental Milestones     Urinary incontinence     Acanthosis Nigricans     Vitamin D deficiency     Hyperlipidemia     Hyperandrogenism     Morbid obesity (H)     Moderate intellectual disabilities     No past surgical history on file.    Social History     Tobacco Use      Smoking status: Never     Passive exposure: Past     Smokeless tobacco: Never   Substance Use Topics     Alcohol use: Not on file     No family history on file.      Problem list and past medical, surgical, social, and family histories reviewed & adjusted, as indicated.    Current Outpatient Medications   Medication Sig Dispense Refill     ACCU-CHEK FASTCLIX LANCET DRUM [ACCU-CHEK FASTCLIX LANCET DRUM] USE IN LANCING DEVICE TO CHECK BLOOD SUGAR 2 TIMES PER DAY.  3     ACCU-CHEK GUIDE strips [ACCU-CHEK GUIDE STRIPS] USE TO TEST BLOOD SUGAR 2 TIMES PER DAY.  3     chlorhexidine (HIBICLENS) 4 % liquid Apply topically daily as needed for wound care Wash axilla and groin daily do not put on face 473 mL 11     clindamycin-benzoyl peroxide (BENZACLIN) 1-5 % external gel Apply topically 2 times daily 50 g 6     doxycycline hyclate (VIBRAMYCIN) 100 MG capsule Take 1 capsule (100 mg) by mouth 2 times daily 60 capsule 1     doxycycline monohydrate (MONODOX) 100 MG capsule Take 1 capsule (100 mg) by mouth 2 times daily 60 capsule 6     escitalopram (LEXAPRO) 10 MG tablet        metFORMIN (GLUCOPHAGE XR) 500 MG 24 hr tablet Take 1 tablet (500 mg) by mouth 2 times daily (with meals) for 180 days 180 tablet 1     norethindrone-ethinyl estradiol (MICROGESTIN 1/20) 1-20 MG-MCG tablet Take 1 tablet by mouth daily (Patient not taking: Reported on 1/26/2023) 90 tablet 1     Medication list reviewed and updated as indicated.    No Known Allergies  Allergies reviewed and updated as indicated.  ----------------------------------------------------------------------------------------------------------------------  ROS:  Constitutional, HEENT, cardiovascular, pulmonary, GI, musculoskeletal, neuro, skin, and psych systems are negative, except as otherwise noted.    OBJECTIVE:     /89   Pulse 95   Resp 20   SpO2 97%   There is no height or weight on file to calculate BMI.  Exam:  Constitutional: healthy, alert and no distress  Head:  Normocephalic. Atraumatic  Neck: Neck supple. FROM  Cardiovascular: Acyanotic  Respiratory: Normal chest rise. Non-labored breathing.  Musculoskeletal: extremities normal- no gross deformities noted, gait normal and normal muscle tone  Skin: no suspicious lesions or rashes  Neurologic: Gait normal. CN II-XII grossly intact  Psychiatric: mentation slowed and affect normal/bright

## 2024-10-29 ENCOUNTER — OFFICE VISIT (OUTPATIENT)
Dept: FAMILY MEDICINE | Facility: CLINIC | Age: 24
End: 2024-10-29
Payer: COMMERCIAL

## 2024-10-29 DIAGNOSIS — F71 MODERATE INTELLECTUAL DISABILITIES: Primary | ICD-10-CM

## 2024-10-29 PROCEDURE — 99213 OFFICE O/P EST LOW 20 MIN: CPT | Mod: 25

## 2024-10-29 PROCEDURE — 90480 ADMN SARSCOV2 VAC 1/ONLY CMP: CPT

## 2024-10-29 PROCEDURE — 90471 IMMUNIZATION ADMIN: CPT

## 2024-10-29 PROCEDURE — 90656 IIV3 VACC NO PRSV 0.5 ML IM: CPT

## 2024-10-29 PROCEDURE — 91320 SARSCV2 VAC 30MCG TRS-SUC IM: CPT

## 2024-10-29 NOTE — PROGRESS NOTES
Assessment & Plan   Moderate intellectual disability  Hx of known moderate intellectual disability presenting today with medical opinion forms. Reports formal psychiatric/workability evaluation from [employer], and we will request [facility] for further information. On evaluation, patient does have difficulty communication, particularly with work-finding. She has no history of employment. The extent of her intellectual disability must be further evaluated, will await information from [employer]. Will fill out medical opinion form for the year at this time. Will see patient back at routine preventative visit in 1 month.    Hyperlipidemia  Screening for STDs (sexually transmitted diseases)  Screening for HIV (human immunodeficiency virus)  Need for hepatitis C screening test  Cervical cancer screening  Discussed need for routine screening and preventative care visit. Patient was amenable to return and schedule preventative care visit.    Return in 23 days (on 11/21/2024) for Routine preventive, with me.    Nadege Douglass is a 24 year old, presenting for the following health issues:  Forms (Medical opinion form )        10/29/2024    10:06 AM   Additional Questions   Roomed by Tata   Accompanied by Home Service support         10/29/2024   Forms   Any forms needing to be completed Yes          10/29/2024    Information    services provided? No     HPI   She presents with her , who helps provide some history. She presents to fill out a medical opinion form. She reports hx of developmental disability, difficulty communicating, and difficulty understanding things. She reports having special education in high school, and she graduated from highschool in 2019. She has not held a job since graduating [employer]. She reports having undergone formal evaluation for her intellectual disability with [employer], sometime in 2023.     Her daily life includes helping her mother  "with chores, playing video games, and reading fantasy novels. Evonne reports that she feels she has most difficulty with understanding instructions and communicating effectively, which preclude her from working.     Review of Systems  Constitutional, neuro, ENT, endocrine, pulmonary, cardiac, gastrointestinal, genitourinary, musculoskeletal, integument and psychiatric systems are negative, except as otherwise noted.      Objective    /89   Pulse 110   Temp 98.7  F (37.1  C)   Ht 1.689 m (5' 6.5\")   Wt (!) 150.6 kg (332 lb)   LMP  (LMP Unknown)   SpO2 97%   BMI 52.78 kg/m    Body mass index is 52.78 kg/m .  Physical Exam   GENERAL: alert and no distress  EYES: Eyes grossly normal to inspection, conjunctivae and sclerae normal  MS: no gross musculoskeletal defects noted, no edema  NEURO: mentation intact and speech normal, has some difficulty word-finding when prompted to describe difficulties in cognition.  PSYCH: mentation appears normal, affect normal/bright    Raquel Ruiz, MS3  October 29, 2024  11:44 AM    I was present with the medical student who participated in the service and in the documentation of this note. I have verified the history and personally performed the physical exam and medical decision making, and have verified the content of the note, which accurately reflects my assessment of the patient and the plan of care.     Norma Carlson MD, PGY-3  [facility]   Pager #: [phone]      The longitudinal plan of care for the diagnosis(es)/condition(s) as documented were addressed during this visit. Due to the added complexity in care, I will continue to support Evonne in the subsequent management and with ongoing continuity of care.       Signed Electronically by: Norma Carlson MD    "

## 2024-10-31 VITALS
RESPIRATION RATE: 22 BRPM | WEIGHT: 293 LBS | TEMPERATURE: 98.7 F | BODY MASS INDEX: 45.99 KG/M2 | OXYGEN SATURATION: 97 % | HEIGHT: 67 IN | SYSTOLIC BLOOD PRESSURE: 136 MMHG | DIASTOLIC BLOOD PRESSURE: 89 MMHG | HEART RATE: 110 BPM